# Patient Record
Sex: FEMALE | Race: WHITE
[De-identification: names, ages, dates, MRNs, and addresses within clinical notes are randomized per-mention and may not be internally consistent; named-entity substitution may affect disease eponyms.]

---

## 2019-08-08 ENCOUNTER — HOSPITAL ENCOUNTER (OUTPATIENT)
Dept: HOSPITAL 35 - GI | Age: 24
Discharge: HOME | End: 2019-08-08
Attending: SPECIALIST
Payer: COMMERCIAL

## 2019-08-08 DIAGNOSIS — K44.9: ICD-10-CM

## 2019-08-08 DIAGNOSIS — K31.9: Primary | ICD-10-CM

## 2019-08-08 DIAGNOSIS — R13.19: ICD-10-CM

## 2019-08-08 PROCEDURE — 62900: CPT

## 2019-08-08 PROCEDURE — 62110: CPT

## 2019-08-09 NOTE — PATH
[facility]
[address]
[place]   [address]                     PATHOLOGY RPT PROCEDURE       
_______________________________________________________________________________
 
Name:       GABRIELLA RENNERSTEVE RICHARDSON                  Room #:                     REG [facility]SHA [facility].#:      [id number]     Account #:      [id number]  
Admission:  08/08/19    Date of Birth:  [birth year]  
Discharge:                                              Report #:    [id number]
                                                        Path Case #: [id number]
_______________________________________________________________________________
 
[facility] Accession Number: [id number]
.                                                                01
Material submitted:                                        .
stomach - BX GASTRITIS R/O H PYLORI
.                                                                01
Clinical history:                                          .
GERD
Gastritis, dysphagia, small hiatus hernia
Rule H. pylori
.                                                                02
**********************************************************************
Diagnosis:
Gastric mucosa, gastritis, rule out H. pylori, endoscopic biopsy:
- Mild reactive gastropathy.
- Negative for intestinal metaplasia or atrophy.
- Negative for Helicobacter pylori (properly controlled
immunohistochemical stain performed).
(IUV:pit; 08/09/2019)
QTP/08/09/2019
**********************************************************************
.                                                                02
Electronically signed:                                     .
Domenica Juarez MD, Pathologist
NPI- [id number]
.                                                                01
Gross description:                                         .
The specimen is received in formalin, labeled "Tiffany Renner, BX gastritis"
and consists of multiple fragments of pink-tan tissue measuring 1.1 x 0.6
x 0.3 cm in aggregate which are entirely submitted in A1.
(SDY; 8/8/2019)
SYU/SYU
.                                                                02
Pathologist provided ICD-10:
K31.9
.                                                                02
CPT                                                        .
149235, M84269
Specimen Comment: A courtesy copy of this report has been sent to
Specimen Comment: [phone].
Specimen Comment: Report sent to 
***Performed at:  01
   [facility], [place], [place]  [id number]
   MD Cedric Madsen MD Phone:  [phone]
***Performed at:  02
   [facility]  [id number]
 
 
[address]   [address]                     PATHOLOGY RPT PROCEDURE       
_______________________________________________________________________________
 
Name:       TIFFANY RENNER                  Room #:                     REG CLI 
[facility].#:      [id number]     Account #:      [id number]  
Admission:  08/08/19    Date of Birth:  [birth year]  
Discharge:                                              Report #:    [id number]
                                                        Path Case #: [id number]
_______________________________________________________________________________
   MD Domenica Juarez MD Phone:  [phone]

## 2019-08-10 NOTE — P
[facility]
[address]
[place]   [address]                     PROCEDURE REPORT              
_______________________________________________________________________________
 
Name:       ZURDOCAREN R                  Room #:                     REG [facility]#:      [id number]                       Account #:      [id number]  
Admission:  08/08/19    Attend Phys:    Jb Mcpherson MD   
Discharge:              Date of Birth:  [birth year]  
                                                          Report #: [id number]
                                                                    [id number]   
_______________________________________________________________________________
THIS REPORT FOR:   //name//                          
 
CC: [facility] physician/PCP
    Jb Mcpherson
 
DATE OF SERVICE:  08/08/2019
 
 
OUTPATIENT UPPER ENDOSCOPY
 
BRIEF HISTORY:  The patient is a 24-year-old woman with recurrent nausea and
vomiting as well as dysphagia and abdominal pain.  She was evaluated in [place], [place] in the past.  She describes a previous endoscopy, at which time she was
told she had esophageal ulcers.  She also had a gastric emptying study and was
told she has mild gastroparesis.  She is not able to tolerate metoclopramide. 
She now reports she is having increasing vomiting and vomiting essentially on a
daily basis.  She will vomit up food, which she has eaten.  She has not vomited
up any blood.  She has not vomited up food that she had eaten the night before.
 
PREOPERATIVE DIAGNOSES:  Nausea, vomiting, dysphagia and abdominal pain.
 
POSTOPERATIVE DIAGNOSES:
1.  Diffuse gastritis.
2.  Small, less than 2-cm sliding-type hiatus hernia, intermittently seen.
3.  Dysphagia.
 
MEDICATIONS:  Deep sedation with propofol per anesthesia.
 
SPECIMEN:  Biopsies of gastritis.
 
ESTIMATED BLOOD LOSS:  3 mL.
 
PROCEDURE:  EGD with biopsy.
 
FINDINGS:  Prior to propofol sedation, procedure of upper endoscopy was
discussed with the patient as well as potential risks and its complications. 
She indicates she understands and desires to proceed.
 
DESCRIPTION OF PROCEDURE:  With the patient in left lateral decubitus position,
the Olympus video endoscope was inserted in the cervical esophagus under direct
vision without difficulty.  Examination of this organ through its entire length
revealed normal esophageal mucosa down the squamocolumnar junction.  The
squamocolumnar junction was inspected and noted to be unremarkable.  There was
no evidence of esophagitis or ulcers.  There is no evidence of Escalante mucosa. 
No strictures or masses were seen.  Intermittently, a less than 2-cm sliding
type hiatus hernia was seen.  Scope was advanced in the stomach, which was
 
 
 
[facility]
[address] [place]   [address]                     PROCEDURE REPORT              
_______________________________________________________________________________
 
Name:       ZURDOCAREN R                  Room #:                     REG [facility] 
DAHIANA.#:      [id number]                       Account #:      [id number]  
Admission:  08/08/19    Attend Phys:    Jb Mcpherson MD   
Discharge:              Date of Birth:  [birth year]  
                                                          Report #: [id number]
                                                                    [id number]   
_______________________________________________________________________________
examined on end view as well as retroflexed views.  There was moderate antral
erythema.  The mucosa was intact.  No ulcers or erosions were seen.  There were
no retained solids or liquids in the stomach.  Upon retroflexion, no
abnormalities were identified.  The pylorus, duodenal bulb and postbulbar
duodenal sweep down to the third portion was noted to be unremarkable.  The
duodenal papilla was identified, and clear yellow bile was streaming from it. 
At that point, the scope was slowly withdrawn and careful circumferential views
confirmed the above findings.  The patient tolerated the procedure well.
 
Following the procedure, she was dilated with passage of a 50-French Posey
dilator.  There was no resistance.
 
CONDITION OF THE PATIENT UPON DISCHARGE:  Following procedure, the patient
drowsy, aroused, conversant and will be discharged home when fully ambulatory.
 
INSTRUCTIONS TO THE PATIENT AND FAMILY AT THE TIME OF DISCHARGE:  The patient
with recurrent nausea, vomiting, abdominal pain and dysphagia.  No evidence of
ulcer disease or esophagitis today.  We will change her PPI from omeprazole to
pantoprazole 40 mg twice daily.  Also, have her use Zofran as needed for nausea.
 We need to try to obtain records from her previous evaluation.  Also, she can
use Zofran every 6 hours as needed for nausea.  She is to return to see me in
followup in the office.
 
 
 
 
 
 
 
 
 
 
 
 
 
 
 
 
 
 
 
 
 
 
  <ELECTRONICALLY SIGNED>
   By: Jb Mcpherson MD           
  08/10/19     1048
D: 08/08/19 1024                           _____________________________________
T: 08/08/19 2238                           Jb Mcpherson MD             /nt